# Patient Record
Sex: FEMALE | Race: WHITE | NOT HISPANIC OR LATINO | Employment: FULL TIME | URBAN - METROPOLITAN AREA
[De-identification: names, ages, dates, MRNs, and addresses within clinical notes are randomized per-mention and may not be internally consistent; named-entity substitution may affect disease eponyms.]

---

## 2019-03-19 DIAGNOSIS — R10.2 PELVIC PAIN: Primary | ICD-10-CM

## 2019-03-27 ENCOUNTER — PROCEDURE VISIT (OUTPATIENT)
Dept: OBSTETRICS AND GYNECOLOGY | Facility: CLINIC | Age: 54
End: 2019-03-27
Payer: MEDICAID

## 2019-03-27 ENCOUNTER — OFFICE VISIT (OUTPATIENT)
Dept: OBSTETRICS AND GYNECOLOGY | Facility: CLINIC | Age: 54
End: 2019-03-27
Payer: MEDICAID

## 2019-03-27 VITALS
DIASTOLIC BLOOD PRESSURE: 85 MMHG | SYSTOLIC BLOOD PRESSURE: 148 MMHG | HEIGHT: 64 IN | BODY MASS INDEX: 24.79 KG/M2 | HEART RATE: 76 BPM | WEIGHT: 145.19 LBS

## 2019-03-27 DIAGNOSIS — R10.2 PELVIC PAIN: ICD-10-CM

## 2019-03-27 DIAGNOSIS — N85.6 INTRAUTERINE ADHESIONS: ICD-10-CM

## 2019-03-27 DIAGNOSIS — N93.8 DYSFUNCTIONAL UTERINE BLEEDING: Primary | ICD-10-CM

## 2019-03-27 PROCEDURE — 76830 PR  ECHOGRAPHY,TRANSVAGINAL: ICD-10-PCS | Mod: S$GLB,,, | Performed by: OBSTETRICS & GYNECOLOGY

## 2019-03-27 PROCEDURE — 99212 OFFICE O/P EST SF 10 MIN: CPT | Mod: 25,S$GLB,, | Performed by: OBSTETRICS & GYNECOLOGY

## 2019-03-27 PROCEDURE — 99212 PR OFFICE/OUTPT VISIT, EST, LEVL II, 10-19 MIN: ICD-10-PCS | Mod: 25,S$GLB,, | Performed by: OBSTETRICS & GYNECOLOGY

## 2019-03-27 PROCEDURE — 76830 TRANSVAGINAL US NON-OB: CPT | Mod: S$GLB,,, | Performed by: OBSTETRICS & GYNECOLOGY

## 2019-03-27 RX ORDER — ALPRAZOLAM 0.5 MG/1
TABLET ORAL
COMMUNITY

## 2019-03-27 RX ORDER — SULFAMETHOXAZOLE AND TRIMETHOPRIM 800; 160 MG/1; MG/1
TABLET ORAL
Refills: 0 | COMMUNITY
Start: 2019-03-18 | End: 2021-05-12

## 2019-03-27 RX ORDER — METHENAMINE, SODIUM PHOSPHATE, MONOBASIC, MONOHYDRATE, PHENYL SALICYLATE, METHYLENE BLUE, AND HYOSCYAMINE SULFATE 118; 40.8; 36; 10; .12 MG/1; MG/1; MG/1; MG/1; MG/1
CAPSULE ORAL
Refills: 2 | COMMUNITY
Start: 2019-03-19

## 2019-03-27 RX ORDER — OXYBUTYNIN CHLORIDE 10 MG/1
TABLET, EXTENDED RELEASE ORAL
COMMUNITY
Start: 2019-03-01 | End: 2021-05-12

## 2019-03-27 RX ORDER — LEVOTHYROXINE SODIUM 75 UG/1
TABLET ORAL
COMMUNITY

## 2019-03-27 RX ORDER — SERTRALINE HYDROCHLORIDE 100 MG/1
TABLET, FILM COATED ORAL
COMMUNITY

## 2019-03-27 NOTE — PROGRESS NOTES
Is patient is here today had a little episode of some vaginal bleeding she had previously had a removal of 1 tube and ovary and a ablation (rollerball ) have put her on some Provera and had slow the bleeding ultrasound today did show little bit of a pocket in the middle of the uterus.  She has no pre-catheter cyst endometrial carcinoma therefore and she feels a little down off her hormones and therefore I am going to put her on femHrtlow she control of bleeding in she is having minimal pain total evaluated the pressure would be doing hysterectomy however I feel is no no indications for this whatsoever and she is very low risk

## 2019-03-27 NOTE — PATIENT INSTRUCTIONS
Patient is to take Femhrt low.  She has been on Provera 1st 12 days of each month has had no bleeding but does not feel good on it.  She has had a previous ablation ultrasound shows a little bit of fluid retention at think in the and some synechiae she has no history ofinc risk factors for endom hyperplasia

## 2019-03-27 NOTE — PROCEDURES
Procedures sonogram showed little fluid collection middle of the uterus with some scarring which I feel is from previous ablation.  Urine urinalysis was done and was negative

## 2019-10-04 ENCOUNTER — OFFICE VISIT (OUTPATIENT)
Dept: ORTHOPEDICS | Facility: CLINIC | Age: 54
End: 2019-10-04
Payer: MEDICAID

## 2019-10-04 VITALS — HEIGHT: 62 IN | TEMPERATURE: 98 F | BODY MASS INDEX: 25.73 KG/M2 | WEIGHT: 139.81 LBS

## 2019-10-04 DIAGNOSIS — G56.02 LEFT CARPAL TUNNEL SYNDROME: Primary | ICD-10-CM

## 2019-10-04 PROCEDURE — 20526 CARPAL TUNNEL: L RADIOCARPAL: ICD-10-PCS | Mod: LT,S$GLB,, | Performed by: ORTHOPAEDIC SURGERY

## 2019-10-04 PROCEDURE — 99201 PR OFFICE/OUTPT VISIT,NEW,LEVL I: ICD-10-PCS | Mod: 25,S$GLB,, | Performed by: ORTHOPAEDIC SURGERY

## 2019-10-04 PROCEDURE — 99201 PR OFFICE/OUTPT VISIT,NEW,LEVL I: CPT | Mod: 25,S$GLB,, | Performed by: ORTHOPAEDIC SURGERY

## 2019-10-04 PROCEDURE — 20526 THER INJECTION CARP TUNNEL: CPT | Mod: LT,S$GLB,, | Performed by: ORTHOPAEDIC SURGERY

## 2019-10-04 NOTE — PROGRESS NOTES
Subjective:      Patient ID: Karishma Best is a 53 y.o. female.    Chief Complaint: Hand Pain (rt)    HPI 53-year-old lady who comes in with EMG and nerve conduction studies showing carpal tunnel syndrome as well as ulnar nerve entrapment at the wrist.  She has been wearing splints with short-term relief.  She is status post right carpal tunnel release several years ago  Review of Systems   Constitution: Negative for fever and weight loss.   Cardiovascular: Negative for chest pain and leg swelling.   Musculoskeletal: Negative for arthritis, joint pain, joint swelling, muscle weakness and stiffness.   Gastrointestinal: Negative for change in bowel habit.   Genitourinary: Negative for bladder incontinence and hematuria.   Neurological: Positive for focal weakness, numbness, paresthesias and sensory change.         Objective:            Ortho/SPM Exam  Active and passive range of motion of the left hand and wrist is normal. She has normal pulses.  She has decreased sensation in both the median and ulnar nerve distribution.  She has a positive carpal compression test          Assessment:       Encounter Diagnosis   Name Primary?    Left carpal tunnel syndrome Yes          Plan:       Karishma was seen today for hand pain.    Diagnoses and all orders for this visit:    Left carpal tunnel syndrome    Carpal Tunnel: L radiocarpal  Date/Time: 10/4/2019 9:00 AM  Performed by: Ric Lowe MD  Authorized by: Ric Lowe MD       Location:  Wrist  Site:  L radiocarpal  Prep: Patient was prepped and draped in usual sterile fashion    Approach:  Volar  Medications:  10 mg triamcinolone acetonide 10 mg/mL  Patient tolerance:  Patient tolerated the procedure well with no immediate complications

## 2020-01-23 ENCOUNTER — OFFICE VISIT (OUTPATIENT)
Dept: ORTHOPEDICS | Facility: CLINIC | Age: 55
End: 2020-01-23
Payer: MEDICAID

## 2020-01-23 DIAGNOSIS — G56.02 LEFT CARPAL TUNNEL SYNDROME: Primary | ICD-10-CM

## 2020-01-23 PROCEDURE — 20526 THER INJECTION CARP TUNNEL: CPT | Mod: LT,S$GLB,, | Performed by: ORTHOPAEDIC SURGERY

## 2020-01-23 PROCEDURE — 20526 CARPAL TUNNEL: L RADIOCARPAL: ICD-10-PCS | Mod: LT,S$GLB,, | Performed by: ORTHOPAEDIC SURGERY

## 2020-01-23 PROCEDURE — 99212 OFFICE O/P EST SF 10 MIN: CPT | Mod: 25,S$GLB,, | Performed by: ORTHOPAEDIC SURGERY

## 2020-01-23 PROCEDURE — 99212 PR OFFICE/OUTPT VISIT, EST, LEVL II, 10-19 MIN: ICD-10-PCS | Mod: 25,S$GLB,, | Performed by: ORTHOPAEDIC SURGERY

## 2020-01-23 RX ORDER — FESOTERODINE FUMARATE 8 MG/1
TABLET, FILM COATED, EXTENDED RELEASE ORAL
COMMUNITY
Start: 2019-11-07

## 2020-01-23 RX ORDER — LOSARTAN POTASSIUM 25 MG/1
TABLET ORAL
COMMUNITY
Start: 2020-01-07

## 2020-01-23 RX ORDER — CYCLOBENZAPRINE HCL 5 MG
5 TABLET ORAL 3 TIMES DAILY PRN
COMMUNITY
Start: 2019-11-25 | End: 2021-05-12

## 2020-01-23 RX ORDER — ASPIRIN 81 MG/1
81 TABLET ORAL DAILY
COMMUNITY

## 2020-01-23 RX ORDER — METOPROLOL SUCCINATE 100 MG/1
TABLET, EXTENDED RELEASE ORAL
COMMUNITY
Start: 2020-01-07 | End: 2021-05-21

## 2020-01-23 RX ORDER — ATORVASTATIN CALCIUM 80 MG/1
TABLET, FILM COATED ORAL
COMMUNITY
Start: 2020-01-07 | End: 2021-05-21

## 2020-01-23 NOTE — PROGRESS NOTES
Subjective:      Patient ID: Karishma Best is a 54 y.o. female.    Chief Complaint: Pain of the Left Hand    HPI patient comes in for another injection in her left carpal tunnel.  She got extended relief from her prior injection    ROS unchanged from prior visit      Objective:      Range of motion of the wrist is normal. She has decreased sensation to light touch in the median nerve distribution.  She has a positive carpal compression test.  She has normal pulses      Ortho/SPM Exam            Assessment:       Encounter Diagnosis   Name Primary?    Left carpal tunnel syndrome Yes          Plan:       Karishma was seen today for pain.    Diagnoses and all orders for this visit:    Left carpal tunnel syndrome     Left carpal tunnel is injected today.  Return p.r.n.

## 2020-01-23 NOTE — PROCEDURES
Carpal Tunnel: L radiocarpal  Date/Time: 1/23/2020 9:00 AM  Performed by: Ric Lowe MD  Authorized by: Ric Lowe MD     Consent Done?:  Yes (Verbal)  Indications:  Pain  Timeout: Prior to procedure the correct patient, procedure, and site was verified      Location:  Wrist  Site:  L radiocarpal  Prep: Patient was prepped and draped in usual sterile fashion    Needle size:  25 G  Approach:  Volar  Medications:  10 mg triamcinolone acetonide 10 mg/mL  Patient tolerance:  Patient tolerated the procedure well with no immediate complications

## 2020-03-12 ENCOUNTER — OFFICE VISIT (OUTPATIENT)
Dept: ORTHOPEDICS | Facility: CLINIC | Age: 55
End: 2020-03-12
Payer: MEDICAID

## 2020-03-12 DIAGNOSIS — M65.351 TRIGGER LITTLE FINGER OF RIGHT HAND: Primary | ICD-10-CM

## 2020-03-12 PROCEDURE — 99213 OFFICE O/P EST LOW 20 MIN: CPT | Mod: 25,S$GLB,, | Performed by: ORTHOPAEDIC SURGERY

## 2020-03-12 PROCEDURE — 99213 PR OFFICE/OUTPT VISIT, EST, LEVL III, 20-29 MIN: ICD-10-PCS | Mod: 25,S$GLB,, | Performed by: ORTHOPAEDIC SURGERY

## 2020-03-12 PROCEDURE — 20550 TENDON SHEATH: R SMALL MCP: ICD-10-PCS | Mod: F9,S$GLB,, | Performed by: ORTHOPAEDIC SURGERY

## 2020-03-12 PROCEDURE — 20550 NJX 1 TENDON SHEATH/LIGAMENT: CPT | Mod: F9,S$GLB,, | Performed by: ORTHOPAEDIC SURGERY

## 2020-03-12 NOTE — PROCEDURES
Tendon Sheath: R small MCP  Date/Time: 3/12/2020 1:15 PM  Performed by: Ric Lowe MD  Authorized by: Ric Lowe MD     Consent Done?:  Yes (Verbal)  Indications:  Pain  Location:  Small finger  Site:  R small MCP (Flexor tendon sheath)  Needle size:  27 G  Approach:  Volar  Medications:  10 mg triamcinolone acetonide 10 mg/mL  Patient tolerance:  Patient tolerated the procedure well with no immediate complications

## 2020-03-12 NOTE — PROGRESS NOTES
Subjective:      Patient ID: Karishma Best is a 54 y.o. female.    Chief Complaint: Pain of the Right Hand    HPI 54-year-old lady with pain and swelling over the region of the A1 pulley of her right 5th finger.  She has not developed triggering yet but feels a catching sensation    Review of Systems   Constitution: Negative for fever and weight loss.   Cardiovascular: Negative for chest pain and leg swelling.   Musculoskeletal: Negative for arthritis, joint pain, joint swelling, muscle weakness and stiffness.   Gastrointestinal: Negative for change in bowel habit.   Genitourinary: Negative for bladder incontinence and hematuria.   Neurological: Negative for focal weakness, numbness, paresthesias and sensory change.         Objective:        Examination of the right hand shows tenderness and mild swelling in the region of the flexor tendon sheath to the 5th finger.  She also is noted to have degenerative changes in her DIP joint.  Range of motion of the finger is normal and she has normal sensation.  She has a tender nodule in the region of the A1 pulley    Ortho/SPM Exam            Assessment:       Encounter Diagnosis   Name Primary?    Trigger little finger of right hand Yes          Plan:       Karishma was seen today for pain.    Diagnoses and all orders for this visit:    Trigger little finger of right hand     The tendon sheath is injected today.  Return p.r.n.

## 2020-07-24 ENCOUNTER — OFFICE VISIT (OUTPATIENT)
Dept: ORTHOPEDICS | Facility: CLINIC | Age: 55
End: 2020-07-24
Payer: MEDICAID

## 2020-07-24 DIAGNOSIS — G56.02 LEFT CARPAL TUNNEL SYNDROME: Primary | ICD-10-CM

## 2020-07-24 PROCEDURE — 20526 CARPAL TUNNEL: L RADIOCARPAL: ICD-10-PCS | Mod: LT,S$GLB,, | Performed by: ORTHOPAEDIC SURGERY

## 2020-07-24 PROCEDURE — 99213 PR OFFICE/OUTPT VISIT, EST, LEVL III, 20-29 MIN: ICD-10-PCS | Mod: 25,S$GLB,, | Performed by: ORTHOPAEDIC SURGERY

## 2020-07-24 PROCEDURE — 20526 THER INJECTION CARP TUNNEL: CPT | Mod: LT,S$GLB,, | Performed by: ORTHOPAEDIC SURGERY

## 2020-07-24 PROCEDURE — 99213 OFFICE O/P EST LOW 20 MIN: CPT | Mod: 25,S$GLB,, | Performed by: ORTHOPAEDIC SURGERY

## 2020-07-24 RX ORDER — MONTELUKAST SODIUM 10 MG/1
TABLET ORAL
COMMUNITY
Start: 2020-07-07

## 2020-07-24 NOTE — PROCEDURES
Carpal Tunnel: L radiocarpal    Date/Time: 7/24/2020 10:00 AM  Performed by: Ric Lowe MD  Authorized by: Ric Lowe MD     Consent Done?:  Yes (Verbal)  Timeout: prior to procedure the correct patient, procedure, and site was verified    Prep: patient was prepped and draped in usual sterile fashion      Local anesthesia used?: No    Location:  Wrist  Site:  L radiocarpal  Needle size:  25 G  Approach:  Volar  Medications:  10 mg triamcinolone acetonide 10 mg/mL  Patient tolerance:  Patient tolerated the procedure well with no immediate complications

## 2020-07-24 NOTE — LETTER
July 24, 2020      Alicia Castro MD  [address] [place] [address]           [facility]  [address]  [place] [address]  Phone: [phone]  Fax: [phone]          Patient: Karishma Best   MR Number: [id number]   YOB: 1965   Date of Visit: 7/24/2020       Dear Dr. Alicia Castro:    Thank you for referring Karishma Best to me for evaluation. Attached you will find relevant portions of my assessment and plan of care.    If you have questions, please do not hesitate to call me. I look forward to following Karishma Best along with you.    Sincerely,    Ric Lowe MD    Enclosure  CC:  No Recipients    If you would like to receive this communication electronically, please contact [email or web address] or [phone] to request more information on [employer] Digital Link access.    For providers and/or their staff who would like to refer a patient to [facility], please contact us through our one-stop-shop provider referral line, [facility], at [phone].    If you feel you have received this communication in error or would no longer like to receive these types of communications, please e-mail [email or web address]

## 2020-07-24 NOTE — PROGRESS NOTES
Subjective:      Patient ID: Karishma Best is a 54 y.o. female.    Chief Complaint: Pain of the Left Wrist    HPI 54-year-old lady comes in with recurrence of her carpal tunnel symptoms.  She got several months of relief from prior injection.  She is wearing splints at night    ROS unchanged from prior visit      Objective:      Active and passive range of motion of the left hand and wrist is normal.  She has normal pulses.    She has decreased sensation in both the median and ulnar nerve distribution to light touch.  She has a positive carpal compression test      Ortho/SPM Exam            Assessment:       Encounter Diagnosis   Name Primary?    Left carpal tunnel syndrome Yes          Plan:       Karishma was seen today for pain.    Diagnoses and all orders for this visit:    Left carpal tunnel syndrome      Carpal tunnel is injected again today.  Return p.r.n.

## 2021-03-04 ENCOUNTER — OFFICE VISIT (OUTPATIENT)
Dept: ORTHOPEDICS | Facility: CLINIC | Age: 56
End: 2021-03-04
Payer: MEDICAID

## 2021-03-04 DIAGNOSIS — G56.02 CARPAL TUNNEL SYNDROME, LEFT: ICD-10-CM

## 2021-03-04 DIAGNOSIS — M65.331 TRIGGER FINGER, RIGHT MIDDLE FINGER: Primary | ICD-10-CM

## 2021-03-04 PROCEDURE — 99213 OFFICE O/P EST LOW 20 MIN: CPT | Mod: S$GLB,,, | Performed by: ORTHOPAEDIC SURGERY

## 2021-03-04 PROCEDURE — 99213 PR OFFICE/OUTPT VISIT, EST, LEVL III, 20-29 MIN: ICD-10-PCS | Mod: S$GLB,,, | Performed by: ORTHOPAEDIC SURGERY

## 2021-03-11 ENCOUNTER — TELEPHONE (OUTPATIENT)
Dept: ORTHOPEDICS | Facility: CLINIC | Age: 56
End: 2021-03-11

## 2021-03-23 ENCOUNTER — TELEPHONE (OUTPATIENT)
Dept: ORTHOPEDICS | Facility: CLINIC | Age: 56
End: 2021-03-23

## 2021-05-04 ENCOUNTER — OFFICE VISIT (OUTPATIENT)
Dept: ORTHOPEDICS | Facility: CLINIC | Age: 56
End: 2021-05-04
Payer: MEDICAID

## 2021-05-04 DIAGNOSIS — M65.331 TRIGGER FINGER, RIGHT MIDDLE FINGER: Primary | ICD-10-CM

## 2021-05-04 PROCEDURE — 99499 NO LOS: ICD-10-PCS | Mod: S$GLB,,, | Performed by: ORTHOPAEDIC SURGERY

## 2021-05-04 PROCEDURE — 99499 UNLISTED E&M SERVICE: CPT | Mod: S$GLB,,, | Performed by: ORTHOPAEDIC SURGERY

## 2021-05-12 ENCOUNTER — PATIENT MESSAGE (OUTPATIENT)
Dept: RESEARCH | Facility: HOSPITAL | Age: 56
End: 2021-05-12

## 2021-05-21 LAB
ANION GAP SERPL CALC-SCNC: 6 MMOL/L (ref 3–11)
BASOPHILS NFR BLD: 0.8 % (ref 0–3)
BUN SERPL-MCNC: 25 MG/DL (ref 7–18)
BUN/CREAT SERPL: 30.12 RATIO (ref 7–18)
CALCIUM SERPL-MCNC: 9.1 MG/DL (ref 8.8–10.5)
CHLORIDE SERPL-SCNC: 105 MMOL/L (ref 100–108)
CO2 SERPL-SCNC: 30 MMOL/L (ref 21–32)
CREAT SERPL-MCNC: 0.83 MG/DL (ref 0.55–1.02)
EOSINOPHIL NFR BLD: 1.9 % (ref 1–3)
ERYTHROCYTE [DISTWIDTH] IN BLOOD BY AUTOMATED COUNT: 13.2 % (ref 12.5–18)
GFR ESTIMATION: > 60
GLUCOSE SERPL-MCNC: 107 MG/DL (ref 70–110)
HCT VFR BLD AUTO: 39.1 % (ref 37–47)
HGB BLD-MCNC: 12 G/DL (ref 12–16)
LYMPHOCYTES NFR BLD: 17.9 % (ref 25–40)
MCH RBC QN AUTO: 30.5 PG (ref 27–31.2)
MCHC RBC AUTO-ENTMCNC: 30.7 G/DL (ref 31.8–35.4)
MCV RBC AUTO: 99.2 FL (ref 80–97)
MONOCYTES NFR BLD: 11 % (ref 1–15)
NEUTROPHILS # BLD AUTO: 5.3 10*3/UL (ref 1.8–7.7)
NEUTROPHILS NFR BLD: 68 % (ref 37–80)
NUCLEATED RED BLOOD CELLS: 0 %
PLATELETS: 199 10*3/UL (ref 142–424)
POTASSIUM SERPL-SCNC: 4 MMOL/L (ref 3.6–5.2)
RBC # BLD AUTO: 3.94 10*6/UL (ref 4.2–5.4)
RBC MORPH BLD: NORMAL
SODIUM BLD-SCNC: 141 MMOL/L (ref 135–145)
WBC # BLD: 7.8 10*3/UL (ref 4.6–10.2)

## 2021-05-21 RX ORDER — METOPROLOL SUCCINATE 50 MG/1
50 TABLET, EXTENDED RELEASE ORAL DAILY
COMMUNITY
Start: 2021-05-07

## 2021-05-21 RX ORDER — ATORVASTATIN CALCIUM 40 MG/1
40 TABLET, FILM COATED ORAL DAILY
COMMUNITY
Start: 2021-04-10

## 2021-05-21 RX ORDER — CYCLOBENZAPRINE HCL 5 MG
TABLET ORAL
COMMUNITY
Start: 2021-05-13

## 2021-05-21 RX ORDER — TRIAMTERENE/HYDROCHLOROTHIAZID 37.5-25 MG
TABLET ORAL
COMMUNITY
Start: 2021-05-12

## 2021-05-25 ENCOUNTER — OUTSIDE PLACE OF SERVICE (OUTPATIENT)
Dept: ADMINISTRATIVE | Facility: OTHER | Age: 56
End: 2021-05-25
Payer: MEDICAID

## 2021-05-25 PROCEDURE — 26055 PR INCISE FINGER TENDON SHEATH: ICD-10-PCS | Mod: F7,,, | Performed by: ORTHOPAEDIC SURGERY

## 2021-05-25 PROCEDURE — 26055 INCISE FINGER TENDON SHEATH: CPT | Mod: F7,,, | Performed by: ORTHOPAEDIC SURGERY

## 2021-06-04 ENCOUNTER — OFFICE VISIT (OUTPATIENT)
Dept: ORTHOPEDICS | Facility: CLINIC | Age: 56
End: 2021-06-04
Payer: MEDICAID

## 2021-06-04 DIAGNOSIS — M65.331 TRIGGER FINGER, RIGHT MIDDLE FINGER: Primary | ICD-10-CM

## 2021-06-04 PROCEDURE — 99024 POSTOP FOLLOW-UP VISIT: CPT | Mod: S$GLB,,, | Performed by: ORTHOPAEDIC SURGERY

## 2021-06-04 PROCEDURE — 99024 PR POST-OP FOLLOW-UP VISIT: ICD-10-PCS | Mod: S$GLB,,, | Performed by: ORTHOPAEDIC SURGERY

## 2021-06-04 RX ORDER — HYDROCODONE BITARTRATE AND ACETAMINOPHEN 5; 325 MG/1; MG/1
1 TABLET ORAL 3 TIMES DAILY PRN
COMMUNITY
Start: 2021-05-25

## 2021-06-04 RX ORDER — BUPROPION HYDROCHLORIDE 150 MG/1
TABLET ORAL
COMMUNITY
Start: 2021-06-02

## 2021-06-04 RX ORDER — MELOXICAM 15 MG/1
15 TABLET ORAL DAILY
Qty: 60 TABLET | Refills: 3 | Status: SHIPPED | OUTPATIENT
Start: 2021-06-04

## 2021-07-06 ENCOUNTER — OFFICE VISIT (OUTPATIENT)
Dept: ORTHOPEDICS | Facility: CLINIC | Age: 56
End: 2021-07-06
Payer: MEDICAID

## 2021-07-06 DIAGNOSIS — M65.351 TRIGGER LITTLE FINGER OF RIGHT HAND: Primary | ICD-10-CM

## 2021-07-06 PROCEDURE — 20550 NJX 1 TENDON SHEATH/LIGAMENT: CPT | Mod: 79,F9,S$GLB, | Performed by: ORTHOPAEDIC SURGERY

## 2021-07-06 PROCEDURE — 99213 OFFICE O/P EST LOW 20 MIN: CPT | Mod: 24,25,S$GLB, | Performed by: ORTHOPAEDIC SURGERY

## 2021-07-06 PROCEDURE — 20550 TENDON SHEATH: ICD-10-PCS | Mod: 79,F9,S$GLB, | Performed by: ORTHOPAEDIC SURGERY

## 2021-07-06 PROCEDURE — 99213 PR OFFICE/OUTPT VISIT, EST, LEVL III, 20-29 MIN: ICD-10-PCS | Mod: 24,25,S$GLB, | Performed by: ORTHOPAEDIC SURGERY

## 2021-09-27 ENCOUNTER — OFFICE VISIT (OUTPATIENT)
Dept: ORTHOPEDICS | Facility: CLINIC | Age: 56
End: 2021-09-27
Payer: MEDICAID

## 2021-09-27 DIAGNOSIS — M65.331 TRIGGER FINGER, RIGHT MIDDLE FINGER: ICD-10-CM

## 2021-09-27 DIAGNOSIS — G56.02 CARPAL TUNNEL SYNDROME, LEFT: Primary | ICD-10-CM

## 2021-09-27 PROCEDURE — 99213 PR OFFICE/OUTPT VISIT, EST, LEVL III, 20-29 MIN: ICD-10-PCS | Mod: 25,S$GLB,, | Performed by: ORTHOPAEDIC SURGERY

## 2021-09-27 PROCEDURE — 99213 OFFICE O/P EST LOW 20 MIN: CPT | Mod: 25,S$GLB,, | Performed by: ORTHOPAEDIC SURGERY

## 2021-09-27 PROCEDURE — 20550 NJX 1 TENDON SHEATH/LIGAMENT: CPT | Mod: F4,S$GLB,, | Performed by: ORTHOPAEDIC SURGERY

## 2021-09-27 PROCEDURE — 20550 TENDON SHEATH: ICD-10-PCS | Mod: F4,S$GLB,, | Performed by: ORTHOPAEDIC SURGERY

## 2021-09-27 RX ORDER — AMOXICILLIN 250 MG/1
CAPSULE ORAL
COMMUNITY
Start: 2021-08-19

## 2021-09-27 RX ORDER — HYDROCODONE BITARTRATE AND ACETAMINOPHEN 7.5; 325 MG/1; MG/1
TABLET ORAL
COMMUNITY
Start: 2021-08-12

## 2022-01-06 ENCOUNTER — TELEPHONE (OUTPATIENT)
Dept: ORTHOPEDICS | Facility: CLINIC | Age: 57
End: 2022-01-06
Payer: MEDICAID

## 2022-01-06 NOTE — TELEPHONE ENCOUNTER
----- Message from Carlotta Shahid LPN sent at 1/5/2022  2:39 PM CST -----  Contact: PT    ----- Message -----  From: Debbi Saleh  Sent: 1/5/2022   2:36 PM CST  To: Mynor Larios Staff        Who Called: Karishma   Who Left Message for Patient: Flory    Does the patient know what this is regarding?: schedule procedure for trigger finger    Would the patient rather a call back or a response via [facility]?  Callback   Best Call Back Number:  [phone] (home) [phone] (work)      Additional Information:

## 2022-01-25 ENCOUNTER — TELEPHONE (OUTPATIENT)
Dept: ORTHOPEDICS | Facility: CLINIC | Age: 57
End: 2022-01-25
Payer: MEDICAID

## 2022-01-25 NOTE — TELEPHONE ENCOUNTER
0952    1/25/22      Spoke with patient.  She was inquiring about switching her surgery to a different procedure. I informed her that she would have to see Dr. Lowe for that issue before we could schedule surgery.  She agrees to keep the surgery date for 2/23/22 of a Left 5th finger trigger release.  She will be seen in the office on 2/21/22 to sign her consents.

## 2022-01-25 NOTE — TELEPHONE ENCOUNTER
----- Message from Aline Stinson sent at 1/24/2022  4:44 PM CST -----  Contact: PT    ----- Message -----  From: Debbi Saleh  Sent: 1/24/2022   4:28 PM CST  To: Mynor Larios Staff    .Type:  Patient Returning Call    Who Called: Karishma     Does the patient know what this is regarding?: procedure 02/21/2022   Would the patient rather a call back or a response via [facility]?  Callback   Best Call Back Number: .[phone] (home) [phone] (work)     Additional Information:  pt is taking care of family in [place]

## 2022-02-21 ENCOUNTER — OFFICE VISIT (OUTPATIENT)
Dept: ORTHOPEDICS | Facility: CLINIC | Age: 57
End: 2022-02-21
Payer: MEDICAID

## 2022-02-21 VITALS — WEIGHT: 124.88 LBS | TEMPERATURE: 98 F | HEIGHT: 61 IN | BODY MASS INDEX: 23.58 KG/M2

## 2022-02-21 DIAGNOSIS — G56.02 CARPAL TUNNEL SYNDROME, LEFT: Primary | ICD-10-CM

## 2022-02-21 DIAGNOSIS — M65.351 TRIGGER LITTLE FINGER OF RIGHT HAND: ICD-10-CM

## 2022-02-21 LAB
ANION GAP SERPL CALC-SCNC: 7 MMOL/L (ref 3–11)
BASOPHILS NFR BLD: 0.6 % (ref 0–3)
BUN SERPL-MCNC: 15 MG/DL (ref 7–18)
BUN/CREAT SERPL: 23.43 RATIO (ref 7–18)
CALCIUM SERPL-MCNC: 8.4 MG/DL (ref 8.8–10.5)
CHLORIDE SERPL-SCNC: 104 MMOL/L (ref 100–108)
CO2 SERPL-SCNC: 31 MMOL/L (ref 21–32)
CREAT SERPL-MCNC: 0.64 MG/DL (ref 0.55–1.02)
EOSINOPHIL NFR BLD: 3.4 % (ref 1–3)
ERYTHROCYTE [DISTWIDTH] IN BLOOD BY AUTOMATED COUNT: 12.4 % (ref 12.5–18)
GFR ESTIMATION: > 60
GLUCOSE SERPL-MCNC: 93 MG/DL (ref 70–110)
HCT VFR BLD AUTO: 36.2 % (ref 37–47)
HGB BLD-MCNC: 12 G/DL (ref 12–16)
LYMPHOCYTES NFR BLD: 36.3 % (ref 25–40)
MCH RBC QN AUTO: 30.7 PG (ref 27–31.2)
MCHC RBC AUTO-ENTMCNC: 33.1 G/DL (ref 31.8–35.4)
MCV RBC AUTO: 92.6 FL (ref 80–97)
MONOCYTES NFR BLD: 11.6 % (ref 1–15)
NEUTROPHILS # BLD AUTO: 2.51 10*3/UL (ref 1.8–7.7)
NEUTROPHILS NFR BLD: 47.7 % (ref 37–80)
NUCLEATED RED BLOOD CELLS: 0 %
PLATELETS: 203 10*3/UL (ref 142–424)
POTASSIUM SERPL-SCNC: 3.5 MMOL/L (ref 3.6–5.2)
RBC # BLD AUTO: 3.91 10*6/UL (ref 4.2–5.4)
SODIUM BLD-SCNC: 142 MMOL/L (ref 135–145)
WBC # BLD: 5.3 10*3/UL (ref 4.6–10.2)

## 2022-02-21 PROCEDURE — 4010F PR ACE/ARB THEARPY RXD/TAKEN: ICD-10-PCS | Mod: CPTII,S$GLB,, | Performed by: ORTHOPAEDIC SURGERY

## 2022-02-21 PROCEDURE — 3008F BODY MASS INDEX DOCD: CPT | Mod: CPTII,S$GLB,, | Performed by: ORTHOPAEDIC SURGERY

## 2022-02-21 PROCEDURE — 99212 PR OFFICE/OUTPT VISIT, EST, LEVL II, 10-19 MIN: ICD-10-PCS | Mod: S$GLB,,, | Performed by: ORTHOPAEDIC SURGERY

## 2022-02-21 PROCEDURE — 1159F MED LIST DOCD IN RCRD: CPT | Mod: CPTII,S$GLB,, | Performed by: ORTHOPAEDIC SURGERY

## 2022-02-21 PROCEDURE — 1159F PR MEDICATION LIST DOCUMENTED IN MEDICAL RECORD: ICD-10-PCS | Mod: CPTII,S$GLB,, | Performed by: ORTHOPAEDIC SURGERY

## 2022-02-21 PROCEDURE — 4010F ACE/ARB THERAPY RXD/TAKEN: CPT | Mod: CPTII,S$GLB,, | Performed by: ORTHOPAEDIC SURGERY

## 2022-02-21 PROCEDURE — 99212 OFFICE O/P EST SF 10 MIN: CPT | Mod: S$GLB,,, | Performed by: ORTHOPAEDIC SURGERY

## 2022-02-21 PROCEDURE — 3008F PR BODY MASS INDEX (BMI) DOCUMENTED: ICD-10-PCS | Mod: CPTII,S$GLB,, | Performed by: ORTHOPAEDIC SURGERY

## 2022-02-21 PROCEDURE — 1160F PR REVIEW ALL MEDS BY PRESCRIBER/CLIN PHARMACIST DOCUMENTED: ICD-10-PCS | Mod: CPTII,S$GLB,, | Performed by: ORTHOPAEDIC SURGERY

## 2022-02-21 PROCEDURE — 1160F RVW MEDS BY RX/DR IN RCRD: CPT | Mod: CPTII,S$GLB,, | Performed by: ORTHOPAEDIC SURGERY

## 2022-02-21 NOTE — PROGRESS NOTES
Subjective:      Patient ID: Karishma Best is a 56 y.o. female.    Chief Complaint: Pain of the Right Hand and Pain of the Left Hand    HPI patient with osteoarthritis and triggering of the right 5th finger comes in today to discuss possible injection versus surgery.  She is on the surgery schedule for left carpal tunnel release and Guyon's canal exploration.    Review of Systems   Constitutional: Negative for fever and weight loss.   Cardiovascular: Negative for chest pain and leg swelling.   Musculoskeletal: Positive for arthritis, joint pain and stiffness. Negative for joint swelling and muscle weakness.   Gastrointestinal: Negative for change in bowel habit.   Genitourinary: Negative for bladder incontinence and hematuria.   Neurological: Positive for numbness, paresthesias and sensory change. Negative for focal weakness.         Objective:      Patient has decreased sensation in the median and ulnar nerve distribution of the left hand to light touch.  She has obvious osteoarthritis of the PIP joint of her right 5th finger.  She has mild triggering with range of motion of the 5th finger.      Ortho/SPM Exam            Assessment:       Encounter Diagnoses   Name Primary?    Carpal tunnel syndrome, left Yes    Trigger little finger of right hand           Plan:       Karishma was seen today for pain and pain.    Diagnoses and all orders for this visit:    Carpal tunnel syndrome, left    Trigger little finger of right hand    She is scheduled for left carpal tunnel release and Guyon's canal exploration.  We can inject her trigger finger at the same time.

## 2022-02-23 ENCOUNTER — OUTSIDE PLACE OF SERVICE (OUTPATIENT)
Dept: ADMINISTRATIVE | Facility: OTHER | Age: 57
End: 2022-02-23
Payer: MEDICAID

## 2022-02-23 PROCEDURE — 20550 PR INJECT TENDON SHEATH/LIGAMENT: ICD-10-PCS | Mod: 59,51,RT, | Performed by: ORTHOPAEDIC SURGERY

## 2022-02-23 PROCEDURE — 64721 CARPAL TUNNEL SURGERY: CPT | Mod: LT,,, | Performed by: ORTHOPAEDIC SURGERY

## 2022-02-23 PROCEDURE — 64721 PR REVISE MEDIAN N/CARPAL TUNNEL SURG: ICD-10-PCS | Mod: LT,,, | Performed by: ORTHOPAEDIC SURGERY

## 2022-02-23 PROCEDURE — 64719 PR REVISE ULNAR NERVE AT WRIST: ICD-10-PCS | Mod: 59,51,LT, | Performed by: ORTHOPAEDIC SURGERY

## 2022-02-23 PROCEDURE — 20550 NJX 1 TENDON SHEATH/LIGAMENT: CPT | Mod: 59,51,RT, | Performed by: ORTHOPAEDIC SURGERY

## 2022-02-23 PROCEDURE — 64719 REVISE ULNAR NERVE AT WRIST: CPT | Mod: 59,51,LT, | Performed by: ORTHOPAEDIC SURGERY

## 2022-03-03 ENCOUNTER — PATIENT MESSAGE (OUTPATIENT)
Dept: ORTHOPEDICS | Facility: CLINIC | Age: 57
End: 2022-03-03
Payer: MEDICAID

## 2022-05-23 ENCOUNTER — OFFICE VISIT (OUTPATIENT)
Dept: ORTHOPEDICS | Facility: CLINIC | Age: 57
End: 2022-05-23
Payer: MEDICAID

## 2022-05-23 DIAGNOSIS — M65.351 TRIGGER LITTLE FINGER OF RIGHT HAND: Primary | ICD-10-CM

## 2022-05-23 PROCEDURE — 4010F PR ACE/ARB THEARPY RXD/TAKEN: ICD-10-PCS | Mod: CPTII,S$GLB,, | Performed by: ORTHOPAEDIC SURGERY

## 2022-05-23 PROCEDURE — 20550 TENDON SHEATH: ICD-10-PCS | Mod: 79,RT,S$GLB, | Performed by: ORTHOPAEDIC SURGERY

## 2022-05-23 PROCEDURE — 1159F PR MEDICATION LIST DOCUMENTED IN MEDICAL RECORD: ICD-10-PCS | Mod: CPTII,S$GLB,, | Performed by: ORTHOPAEDIC SURGERY

## 2022-05-23 PROCEDURE — 1160F RVW MEDS BY RX/DR IN RCRD: CPT | Mod: CPTII,S$GLB,, | Performed by: ORTHOPAEDIC SURGERY

## 2022-05-23 PROCEDURE — 4010F ACE/ARB THERAPY RXD/TAKEN: CPT | Mod: CPTII,S$GLB,, | Performed by: ORTHOPAEDIC SURGERY

## 2022-05-23 PROCEDURE — 99213 PR OFFICE/OUTPT VISIT, EST, LEVL III, 20-29 MIN: ICD-10-PCS | Mod: 24,25,S$GLB, | Performed by: ORTHOPAEDIC SURGERY

## 2022-05-23 PROCEDURE — 1159F MED LIST DOCD IN RCRD: CPT | Mod: CPTII,S$GLB,, | Performed by: ORTHOPAEDIC SURGERY

## 2022-05-23 PROCEDURE — 20550 NJX 1 TENDON SHEATH/LIGAMENT: CPT | Mod: 79,RT,S$GLB, | Performed by: ORTHOPAEDIC SURGERY

## 2022-05-23 PROCEDURE — 99213 OFFICE O/P EST LOW 20 MIN: CPT | Mod: 24,25,S$GLB, | Performed by: ORTHOPAEDIC SURGERY

## 2022-05-23 PROCEDURE — 1160F PR REVIEW ALL MEDS BY PRESCRIBER/CLIN PHARMACIST DOCUMENTED: ICD-10-PCS | Mod: CPTII,S$GLB,, | Performed by: ORTHOPAEDIC SURGERY

## 2022-05-23 NOTE — PROCEDURES
Tendon Sheath    Date/Time: 5/23/2022 1:00 PM  Performed by: Ric Lowe MD  Authorized by: Ric Lowe MD     Consent Done?:  Yes (Verbal)  Prep: patient was prepped and draped in usual sterile fashion      Local anesthesia used?: No    Location:  Small finger  Site:  R small flexor tendon sheath  Approach:  Volar  Medications:  10 mg triamcinolone acetonide 10 mg/mL  Patient tolerance:  Patient tolerated the procedure well with no immediate complications

## 2022-05-23 NOTE — PROGRESS NOTES
Subjective:      Patient ID: Karihsma Best is a 56 y.o. female.    Chief Complaint: Pain of the Right Hand    HPI 56-year-old lady comes in with recurrent right 5th finger trigger finger.  She responded well to prior injection    ROS unchanged from prior visit      Objective:      Active and passive range of motion of the right 5th finger is normal.  She has a tender nodule in the region of the A1 pulley and triggering with range of motion.  She has normal capillary refill      Ortho/SPM Exam            Assessment:       Encounter Diagnosis   Name Primary?    Trigger little finger of right hand Yes          Plan:       Karishma was seen today for pain.    Diagnoses and all orders for this visit:    Trigger little finger of right hand    Flexor tendon sheath is injected.  Return p.r.n.

## 2022-08-18 ENCOUNTER — OFFICE VISIT (OUTPATIENT)
Dept: ORTHOPEDICS | Facility: CLINIC | Age: 57
End: 2022-08-18
Payer: MEDICAID

## 2022-08-18 DIAGNOSIS — M65.351 TRIGGER LITTLE FINGER OF RIGHT HAND: Primary | ICD-10-CM

## 2022-08-18 PROCEDURE — 1159F MED LIST DOCD IN RCRD: CPT | Mod: CPTII,S$GLB,, | Performed by: ORTHOPAEDIC SURGERY

## 2022-08-18 PROCEDURE — 1159F PR MEDICATION LIST DOCUMENTED IN MEDICAL RECORD: ICD-10-PCS | Mod: CPTII,S$GLB,, | Performed by: ORTHOPAEDIC SURGERY

## 2022-08-18 PROCEDURE — 20550 NJX 1 TENDON SHEATH/LIGAMENT: CPT | Mod: RT,S$GLB,, | Performed by: ORTHOPAEDIC SURGERY

## 2022-08-18 PROCEDURE — 20550 TENDON SHEATH: ICD-10-PCS | Mod: RT,S$GLB,, | Performed by: ORTHOPAEDIC SURGERY

## 2022-08-18 PROCEDURE — 1160F RVW MEDS BY RX/DR IN RCRD: CPT | Mod: CPTII,S$GLB,, | Performed by: ORTHOPAEDIC SURGERY

## 2022-08-18 PROCEDURE — 99212 PR OFFICE/OUTPT VISIT, EST, LEVL II, 10-19 MIN: ICD-10-PCS | Mod: 25,S$GLB,, | Performed by: ORTHOPAEDIC SURGERY

## 2022-08-18 PROCEDURE — 4010F ACE/ARB THERAPY RXD/TAKEN: CPT | Mod: CPTII,S$GLB,, | Performed by: ORTHOPAEDIC SURGERY

## 2022-08-18 PROCEDURE — 99212 OFFICE O/P EST SF 10 MIN: CPT | Mod: 25,S$GLB,, | Performed by: ORTHOPAEDIC SURGERY

## 2022-08-18 PROCEDURE — 1160F PR REVIEW ALL MEDS BY PRESCRIBER/CLIN PHARMACIST DOCUMENTED: ICD-10-PCS | Mod: CPTII,S$GLB,, | Performed by: ORTHOPAEDIC SURGERY

## 2022-08-18 PROCEDURE — 4010F PR ACE/ARB THEARPY RXD/TAKEN: ICD-10-PCS | Mod: CPTII,S$GLB,, | Performed by: ORTHOPAEDIC SURGERY

## 2022-08-18 NOTE — PROCEDURES
Tendon Sheath    Date/Time: 8/18/2022 10:00 AM  Performed by: Ric Lowe MD  Authorized by: Ric Lowe MD     Consent Done?:  Yes (Verbal)  Local anesthesia used?: No    Location:  Small finger  Site:  R small flexor tendon sheath  Needle size:  27 G  Approach:  Volar  Medications:  10 mg triamcinolone acetonide 10 mg/mL  Patient tolerance:  Patient tolerated the procedure well with no immediate complications

## 2022-08-18 NOTE — PROGRESS NOTES
Subjective:      Patient ID: Karishma Best is a 56 y.o. female.    Chief Complaint: Pain of the Right Hand    HPI  Patient comes in today with recurrence of her right 5th finger trigger finger.  She responded well to prior injection.  ROS noncontributory      Objective:      Range of motion of the 5th finger is normal.  She has a tender nodule in the region of the A1 pulley and triggering with range of motion.  She has normal capillary refill.      Ortho/SPM Exam            Assessment:       Encounter Diagnosis   Name Primary?    Trigger little finger of right hand Yes          Plan:       Karishma was seen today for pain.    Diagnoses and all orders for this visit:    Trigger little finger of right hand    Flexor tendon sheath is injected.  Return p.r.n.